# Patient Record
Sex: MALE | Race: OTHER
[De-identification: names, ages, dates, MRNs, and addresses within clinical notes are randomized per-mention and may not be internally consistent; named-entity substitution may affect disease eponyms.]

---

## 2018-07-17 ENCOUNTER — HOSPITAL ENCOUNTER (EMERGENCY)
Dept: HOSPITAL 63 - ER | Age: 28
Discharge: HOME | End: 2018-07-17
Payer: OTHER GOVERNMENT

## 2018-07-17 VITALS — BODY MASS INDEX: 33.29 KG/M2 | WEIGHT: 195 LBS | HEIGHT: 64 IN

## 2018-07-17 VITALS — DIASTOLIC BLOOD PRESSURE: 91 MMHG | SYSTOLIC BLOOD PRESSURE: 148 MMHG
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
## 2018-07-17 DIAGNOSIS — S99.912A: Primary | ICD-10-CM

## 2018-07-17 PROCEDURE — 99284 EMERGENCY DEPT VISIT MOD MDM: CPT

## 2018-07-17 PROCEDURE — L4350 ANKLE CONTROL ORTHO PRE OTS: HCPCS

## 2018-07-17 PROCEDURE — 73610 X-RAY EXAM OF ANKLE: CPT

## 2018-07-17 PROCEDURE — 73630 X-RAY EXAM OF FOOT: CPT

## 2018-07-17 NOTE — PHYS DOC
Adult General


HPI


HPI





Patient is a 28-year-old male who presents for evaluation of left ankle injury 

and pain. He states that he was jumping over a chhaya yesterday and came down 

in the left ankle and felt and heard a sudden pop. He was able to initially 

walk on the ankle without much difficulty or pain the throat the day the 

swelling increased. He iced it at home. When he woke up today and try to walk 

the pain was worse. He does mention that he did walk to the emergency 

department from home today. He is alert and oriented 4, calm, and appears to 

be no distress. There is some swelling to the lateral side of the left upper 

foot. He denies previous injury to the left lower extremity and did not fall or 

injure anything else.





Review of Systems


Review of Systems





Constitutional: Denies fever or chills []


Eyes: Denies change in visual acuity, redness, or eye pain []


HENT: Denies nasal congestion or sore throat []


Respiratory: Denies cough or shortness of breath []


Cardiovascular: No additional information not addressed in HPI []


GI: Denies abdominal pain, nausea, vomiting, bloody stools or diarrhea []


: Denies dysuria or hematuria []


Musculoskeletal: Denies back pain [] left foot/ankle pain


Integument: Denies rash or skin lesions []


Neurologic: Denies headache, focal weakness or sensory changes []


Endocrine: Denies polyuria or polydipsia []





All other systems were reviewed and found to be within normal limits, except as 

documented in this note.





Current Medications


Current Medications





Current Medications








 Medications


  (Trade)  Dose


 Ordered  [facility]  Start Time


 Stop Time Status Last Admin


Dose Admin


 


 Tramadol HCl


  (Ultram)  50 mg  1X  ONCE  18 08:15


 18 08:16 UNV  














Physical Exam


Physical Exam





Constitutional: Well developed, well nourished, no acute distress, non-toxic 

appearance. []


HENT: Normocephalic, atraumatic, bilateral external ears normal, oropharynx 

moist, no oral exudates, nose normal. []


Eyes: PERRLA, EOMI, conjunctiva normal, no discharge. [] 


Neck: Normal range of motion, no tenderness, supple, no stridor. [] 


Cardiovascular:Heart rate regular rhythm, no murmur []


Lungs & Thorax:  Bilateral breath sounds clear to auscultation []


Abdomen: Bowel sounds normal, soft, no tenderness, no masses, no pulsatile 

masses. [] 


Skin: Warm, dry, no erythema, no rash. [] 


Back: No tenderness, no CVA tenderness. [] 


Extremities: no cyanosis, no clubbing, ROM intact, no edema. [] ttp and 

swelling over left lateral upper foot just inferior and anterior to the lateral 

malleolus, no malleolar tenderness, no fibular head ttp, FROM present, normal 

dp and tp pulses, normal sensation and strength


Neurologic: Alert and oriented X 3, normal motor function, normal sensory 

function, no focal deficits noted. []


Psychologic: Affect normal, judgement normal, mood normal. []





EKG


EKG


[]





Radiology/Procedures


Radiology/Procedures


 [facility] [address], [place] [address]


 [phone]


 


 IMAGING REPORT





 Signed





PATIENT: DIAMOND TOMPKINS ACCOUNT: [id number] MRN#: [id number]


: 1990 LOCATION: ER AGE: 28


SEX: M EXAM DT: 18 ACCESSION#: [id number]


STATUS: REG ER ORD. PHYSICIAN: SWATHI BURT DO 


REASON: left foot pain


PROCEDURE: FOOT LEFT 3V





INDICATION: Injury to foot and ankle. Patient heard a pop. Pain. Injury 


occurred while jumping chhaya yesterday.


 


TECHNIQUE: 3 views of the left ankle and 3 views of the left foot are 


submitted for review. No comparison for either exam is available.


 


FINDINGS:


Ankle: There is no fracture or dislocation. There is no soft tissue 


swelling.


 


Foot: There is no fracture or dislocation. There is no soft tissue 


swelling.


 


IMPRESSION: Negative for an acute fracture or dislocation involving the 


left foot or left ankle.


 


Electronically signed by: Benigno Kidd MD (2018 8:45 AM) 


[facility]














DICTATED AND SIGNED BY:     BENIGNO KIDD MD


DATE:     18 0843





CC: SWATHI BURT DO; COLUMBA EDWARDS DO, MPH ~





Course & Med Decision Making


Course & Med Decision Making


Pertinent Labs and Imaging studies reviewed. (See chart for details)





@0855 - Patient updated on imaging results which do not show any acute 

traumatic pathology. Aircast splint applied and crutches along with education 

provided. Advised ice and anti-inflammatories such as Motrin for home. The 

patient has no further complaints and is stable for discharge at this time.





Dragon Disclaimer


Dragon Disclaimer


This electronic medical record was generated, in whole or in part, using a 

voice recognition dictation system.





Departure


Departure:


Impression:  


 Primary Impression:  


 Left ankle injury


 Additional Impression:  


 Injury of left foot


Disposition:   HOME, SELF-CARE


Condition:  STABLE


Referrals:  


COLUMBA EDWARDS DO, MPH (PCP)


Patient Instructions:  Ankle Pain, Ankle Sprain, Crutch Use





Additional Instructions:  


Take the prescribed medication as needed. Return to the ER for new or worsening 

symptoms. Follow up with her primary care physician in the next 1-2 days.


Scripts


Tramadol Hcl (ULTRAM) 50 Mg Tablet


50 MG PO PRN Q6HRS PRN for PAIN, #10 TAB


   Prov: SWATHI BURT DO         18





Problem Qualifiers











SWATHI BURT DO 2018 08:14

## 2018-07-17 NOTE — RAD
INDICATION: Injury to foot and ankle. Patient heard a pop. Pain. Injury 

occurred while jumping chhaya yesterday.

 

TECHNIQUE: 3 views of the left ankle and 3 views of the left foot are 

submitted for review. No comparison for either exam is available.

 

FINDINGS:

Ankle: There is no fracture or dislocation. There is no soft tissue 

swelling.

 

Foot: There is no fracture or dislocation. There is no soft tissue 

swelling.

 

IMPRESSION: Negative for an acute fracture or dislocation involving the 

left foot or left ankle.

 

Electronically signed by: Benigno Kidd MD (7/17/2018 8:45 AM) 

[facility]

## 2018-07-17 NOTE — RAD
INDICATION: Injury to foot and ankle. Patient heard a pop. Pain. Injury 

occurred while jumping chhaya yesterday.

 

TECHNIQUE: 3 views of the left ankle and 3 views of the left foot are 

submitted for review. No comparison for either exam is available.

 

FINDINGS:

Ankle: There is no fracture or dislocation. There is no soft tissue 

swelling.

 

Foot: There is no fracture or dislocation. There is no soft tissue 

swelling.

 

IMPRESSION: Negative for an acute fracture or dislocation involving the 

left foot or left ankle.

 

Electronically signed by: Benigno Kidd MD (7/17/2018 8:45 AM) 

[facility]